# Patient Record
Sex: FEMALE | Race: WHITE | URBAN - METROPOLITAN AREA
[De-identification: names, ages, dates, MRNs, and addresses within clinical notes are randomized per-mention and may not be internally consistent; named-entity substitution may affect disease eponyms.]

---

## 2017-10-23 ENCOUNTER — EMERGENCY (EMERGENCY)
Facility: HOSPITAL | Age: 27
LOS: 1 days | Discharge: HOME | End: 2017-10-23
Attending: EMERGENCY MEDICINE | Admitting: EMERGENCY MEDICINE
Payer: COMMERCIAL

## 2017-10-23 VITALS
SYSTOLIC BLOOD PRESSURE: 135 MMHG | DIASTOLIC BLOOD PRESSURE: 96 MMHG | RESPIRATION RATE: 16 BRPM | TEMPERATURE: 99 F | OXYGEN SATURATION: 100 % | HEART RATE: 72 BPM

## 2017-10-23 PROCEDURE — 99284 EMERGENCY DEPT VISIT MOD MDM: CPT

## 2017-10-23 PROCEDURE — 73030 X-RAY EXAM OF SHOULDER: CPT | Mod: 26,LT

## 2017-10-23 RX ORDER — OXYCODONE AND ACETAMINOPHEN 5; 325 MG/1; MG/1
1 TABLET ORAL ONCE
Qty: 0 | Refills: 0 | Status: DISCONTINUED | OUTPATIENT
Start: 2017-10-23 | End: 2017-10-23

## 2017-10-23 RX ORDER — OXYCODONE HYDROCHLORIDE 5 MG/1
1 TABLET ORAL
Qty: 10 | Refills: 0 | OUTPATIENT
Start: 2017-10-23

## 2017-10-23 RX ADMIN — OXYCODONE AND ACETAMINOPHEN 1 TABLET(S): 5; 325 TABLET ORAL at 22:37

## 2017-10-23 NOTE — ED ADULT TRIAGE NOTE - CHIEF COMPLAINT QUOTE
pt states "I dislocated my left shoulder while laying on my couch today, I think I put it back in but I am in pain still". arrives with arm in sling. has dislocated right shoulder in past. no obvious abnormalities noted. no other pmh

## 2017-10-23 NOTE — ED PROVIDER NOTE - OBJECTIVE STATEMENT
27 y/o female hx right shoulder dislocation 1 year ago presents to ED c/o left shoulder injury. Pt. states earlier today she was turning over cough to grab her dog and twisted her arm a cetain way causing her left shouder to "pop" out - states looks defomred and had increased pain - was able to pop shoulder back in and states flet mildly better after but states still c/o pain to left shoulder and pain with moving her fingers. Denies numbness tingling swelling weakness or any other injuries.

## 2017-10-23 NOTE — ED PROVIDER NOTE - ATTENDING CONTRIBUTION TO CARE
Harshal  pt presents with lest shoulder pain  Possibly dislocated then relocated her own sholuder  Still with some left shoulder pain  but clinically  shoulder in place   nl distal strength and sensation   distal left extremity pulse intact  sensory also intact lateral shoulder/upper arm  plain x-ray confirming joint in place

## 2017-10-23 NOTE — ED PROVIDER NOTE - MUSCULOSKELETAL MINIMAL EXAM
left shoulder: mil swelling noted. no palpable defomrity noted. decreased rom secondary to pain. sensations intact.

## 2017-10-23 NOTE — ED PROVIDER NOTE - MEDICAL DECISION MAKING DETAILS
25 y/o female c/o left shoulder pain/injury  -possible dislocation vs relocation  -xrays, pain control  -possible reduction vs ortho follow up

## 2024-10-03 NOTE — ED PROVIDER NOTE - CARDIAC, MLM
[Visual inspection, sensory exam] : Foot exam, including visual inspection, sensory exam with mono filament, and pulse exam, was performed within the last 12 months Normal rate, regular rhythm.  Heart sounds S1, S2.  No murmurs, rubs or gallops.